# Patient Record
Sex: FEMALE | Race: WHITE | NOT HISPANIC OR LATINO | Employment: UNEMPLOYED | URBAN - METROPOLITAN AREA
[De-identification: names, ages, dates, MRNs, and addresses within clinical notes are randomized per-mention and may not be internally consistent; named-entity substitution may affect disease eponyms.]

---

## 2022-02-02 NOTE — PROGRESS NOTES
Subjective: seizures    Patient ID: Elaine Posada is a 37 y.o. female.    CHIEF COMPLAINT:seizures    Seizure onset: At age 26  Last Seizure: 2020   Seizure Frequency: q 3-6 months   Type of Seizures: Generalized convulsive   seizure meds: Dilantin 100 mg ER twice daily    History of Present Illness Ms. Posada is a 37-year-old  female who has a BMI of 31.71 , and presented with her  referred by JASMIN Presley for epilepsy.  New patient referred by Linda LORENZO for seizures.  She was recently placed on Medicaid and is able to have health care now.  She ports about 2 years without any kind of health care whatsoever.  She is not on a multivitamin nor she on any calcium supplement.          Triggers: stress  Duration:seconds-minutes Jolts sporadic   Frequency: 1  Every 3 to 6 months  Timing (awake/asleep/both): awake,   Aura: none  Features at onset: unidentified  Unresponsiveness (none/partial/complete): complete  Loss of awareness (none/partial/complete): complete  Eyes (closed/staring/rolled back/left/right): depends  head deviation (straight/left/right): both  Automatisms (none/lip smacking/fumbling): none  Stiffening/posturing (none/gen/left/right/upper/lower): hands   jerking (none/gen/left/right/upper/lower/asynchronous/myoclonic): yes  Tongue biting: yes  Speech (retained/arrested): arrested  Incontinence (none/bowel/bladder): urinary   Postictal duration/symptoms: minutes to hours, confusion    Last EEG 2010  MRI: None   CT 2018    Past medical history:  Schizophrenia, depression, anxiety, social axiety disorder, paranoid personality disorder, seizures disorder, mild asthma, and anemia  She was in a coma 2009 was on a ventilator.          EXAMINATION: CT head without contrast.   DATE: 05/31/2018   HISTORY: seizure like activity initial encounter     COMPARISON: None.   TECHNIQUE: Standard CT scan of the head without contrast.  All CT scans at this facility use dose modulation,  iterative reconstruction, and/or weight based dosing when appropriate to reduce radiation dose to as low as reasonably achievable.   FINDINGS:   There is no evidence of infarction, mass or hemorrhage. Parenchyma has a normal configuration for age.   The paranasal sinuses are clear. The mastoid air cells are clear.   IMPRESSION:   No acute intracranial findings.   Dictated by: Vee Strickland M.D.   Images and Report reviewed and interpreted by: Vee Strickland M.D.           The following portions of the patient's history were reviewed and updated as appropriate: allergies, current medications, past family history, past medical history, past social history, past surgical history and problem list.      Family History   Problem Relation Age of Onset   • Seizures Mother        Past Medical History:   Diagnosis Date   • Anemia    • Anxiety    • Depression    • Schizophrenia (HCC)        Social History     Socioeconomic History   • Marital status: Unknown   Tobacco Use   • Smoking status: Former Smoker   • Smokeless tobacco: Never Used   Vaping Use   • Vaping Use: Never used   Substance and Sexual Activity   • Alcohol use: Never   • Drug use: Never   • Sexual activity: Defer         Current Outpatient Medications:   •  montelukast (SINGULAIR) 10 MG tablet, Take 10 mg by mouth Daily., Disp: , Rfl:   •  omeprazole (priLOSEC) 20 MG capsule, TAKE ONE (1) CAPSULE BY MOUTH EVERY DAY 30 MINUTES BEFORE morning MEAL, Disp: , Rfl:   •  phenytoin ER (DILANTIN) 100 MG capsule, TAKE ONE (1) CAPSULE BY MOUTH EVERY TWELVE HOURS, Disp: , Rfl:   •  prazosin (MINIPRESS) 1 MG capsule, Take 1 capsule by mouth every night at bedtime., Disp: , Rfl:   •  prazosin (MINIPRESS) 5 MG capsule, Take 5 mg by mouth Every Night., Disp: , Rfl:   •  QUEtiapine (SEROquel) 50 MG tablet, Take 100 mg by mouth 2 (Two) Times a Day., Disp: , Rfl:   •  risperiDONE (risperDAL) 2 MG tablet, Take 4 mg by mouth Daily., Disp: , Rfl:   •  Calcium-Phosphorus-Vitamin D  (Citracal Calcium Gummies) 250-115-250 MG-MG-UNIT chewable tablet, Chew 250 mg Daily., Disp: 90 tablet, Rfl: 5  •  FLUoxetine (PROzac) 20 MG capsule, TAKE ONE (1) CAPSULE BY MOUTH DAILY, Disp: , Rfl:   •  multivitamin-iron-minerals-folic acid (CENTRUM) chewable tablet, Chew 1 tablet Daily., Disp: 30 tablet, Rfl: 11    Review of Systems   Constitutional: Positive for unexpected weight change.   HENT: Negative for ear discharge and ear pain.    Eyes: Negative for pain and itching.   Respiratory: Negative for cough and shortness of breath.    Cardiovascular: Negative for chest pain.   Gastrointestinal: Negative for abdominal pain and nausea.   Endocrine: Positive for cold intolerance. Negative for heat intolerance.   Genitourinary: Negative for urgency.   Musculoskeletal: Positive for back pain. Negative for neck pain.   Neurological: Positive for seizures and speech difficulty.        I have reviewed ROS completed by medical assistant.     Objective:    Neurologic Exam     Mental Status   Oriented to person, place, and time.     Cranial Nerves     CN III, IV, VI   Pupils are equal, round, and reactive to light.    Gait, Coordination, and Reflexes     Gait  Gait: normal    Coordination   Finger to nose coordination: normal  Tandem walking coordination: normal    Reflexes   Right brachioradialis: 2+  Left brachioradialis: 2+  Right biceps: 2+  Left biceps: 2+  Right triceps: 2+  Left triceps: 2+  Right patellar: 2+  Left patellar: 2+  Right achilles: 2+  Left achilles: 2+      Physical Exam  Vitals and nursing note reviewed.   Constitutional:       Appearance: Normal appearance. She is well-developed.   HENT:      Head: Normocephalic.      Right Ear: Tympanic membrane normal.      Mouth/Throat:      Mouth: Mucous membranes are moist.   Eyes:      General: Lids are normal. No visual field deficit.     Pupils: Pupils are equal, round, and reactive to light.   Cardiovascular:      Rate and Rhythm: Normal rate and regular  rhythm.      Pulses: Normal pulses.      Heart sounds: Normal heart sounds, S1 normal and S2 normal.   Pulmonary:      Effort: Pulmonary effort is normal.      Breath sounds: Normal breath sounds.   Musculoskeletal:         General: Normal range of motion.      Cervical back: Full passive range of motion without pain and normal range of motion.      Comments: 5/5 all including  and dorsiflexion and plantar flexion   Neurological:      General: No focal deficit present.      Mental Status: She is alert and oriented to person, place, and time.      Cranial Nerves: Cranial nerves are intact. No cranial nerve deficit, dysarthria or facial asymmetry.      Sensory: Sensation is intact. No sensory deficit.      Motor: Motor function is intact. No weakness, tremor, atrophy, abnormal muscle tone, seizure activity or pronator drift.      Coordination: Coordination is intact. Romberg sign negative. Coordination normal. Finger-Nose-Finger Test and Heel to Shin Test normal. Rapid alternating movements normal.      Gait: Gait is intact. Gait and tandem walk normal.      Deep Tendon Reflexes: Babinski sign absent on the right side. Babinski sign absent on the left side.      Reflex Scores:       Tricep reflexes are 2+ on the right side and 2+ on the left side.       Bicep reflexes are 2+ on the right side and 2+ on the left side.       Brachioradialis reflexes are 2+ on the right side and 2+ on the left side.       Patellar reflexes are 2+ on the right side and 2+ on the left side.       Achilles reflexes are 2+ on the right side and 2+ on the left side.  Psychiatric:         Attention and Perception: Attention normal.         Mood and Affect: Mood normal.         Behavior: Behavior normal. Behavior is cooperative.         Thought Content: Thought content normal.     Discussion:   The patient and her  were notified of seizure precautions and agreed.    They are as follows:-The patient was told to observe all seizure  precautions, including, but not limited to:   -No driving until seizure free for more than 3 months- as per State driving regulation / law;   -Avoid all high-risk activity, Take showers instead of baths, avoid swimming without observation, Avoid open heat sources, Avoid working at heights, and Avoid engaging in all potentially hazardous activities.   -Patient and  expressed clear understanding.  She was notified not to miss any medications and was told that after the EEG is completed we may proceed with changing her to medication instead of Dilantin.  Multivitamin and calcium supplement will be ordered.     Assessment/Plan:    Diagnoses and all orders for this visit:    1. Other epilepsy without status epilepticus, not intractable (HCC) (Primary)  -     EEG Awake or Asleep Routine; Future  -     MRI Brain With & Without Contrast; Future  -     multivitamin-iron-minerals-folic acid (CENTRUM) chewable tablet; Chew 1 tablet Daily.  Dispense: 30 tablet; Refill: 11  -     Calcium-Phosphorus-Vitamin D (Citracal Calcium Gummies) 250-115-250 MG-MG-UNIT chewable tablet; Chew 250 mg Daily.  Dispense: 90 tablet; Refill: 5        Return in about 3 months (around 5/7/2022) for Dr Seipel .    I spent 54 minutes caring for Elaine on this date of service. This time includes time spent by me in the following activities: reviewing tests, obtaining and/or reviewing a separately obtained history, performing a medically appropriate examination and/or evaluation, ordering medications, tests, or procedures, referring and communicating with other health care professionals and documenting information in the medical record.      This document has been electronically signed by Bebe CASTELLANOS on February 7, 2022 13:47 EST

## 2022-02-03 PROBLEM — R56.9 SEIZURE: Status: ACTIVE | Noted: 2022-02-03

## 2022-02-03 PROBLEM — F19.10 SUBSTANCE ABUSE: Status: ACTIVE | Noted: 2022-02-03

## 2022-02-03 RX ORDER — PRAZOSIN HYDROCHLORIDE 1 MG/1
1 CAPSULE ORAL
COMMUNITY
Start: 2022-01-10

## 2022-02-03 RX ORDER — RISPERIDONE 2 MG/1
4 TABLET ORAL DAILY
COMMUNITY
Start: 2022-01-04 | End: 2022-05-17

## 2022-02-03 RX ORDER — QUETIAPINE FUMARATE 50 MG/1
100 TABLET, FILM COATED ORAL 2 TIMES DAILY
COMMUNITY
Start: 2022-01-05 | End: 2022-05-17

## 2022-02-03 RX ORDER — ALBUTEROL SULFATE 90 UG/1
2 AEROSOL, METERED RESPIRATORY (INHALATION)
COMMUNITY
Start: 2022-01-27 | End: 2022-02-07

## 2022-02-03 RX ORDER — MONTELUKAST SODIUM 10 MG/1
10 TABLET ORAL DAILY
COMMUNITY
Start: 2022-01-27 | End: 2022-02-26

## 2022-02-07 ENCOUNTER — TELEPHONE (OUTPATIENT)
Dept: NEUROLOGY | Facility: CLINIC | Age: 37
End: 2022-02-07

## 2022-02-07 ENCOUNTER — OFFICE VISIT (OUTPATIENT)
Dept: NEUROLOGY | Facility: CLINIC | Age: 37
End: 2022-02-07

## 2022-02-07 VITALS
DIASTOLIC BLOOD PRESSURE: 79 MMHG | HEART RATE: 112 BPM | SYSTOLIC BLOOD PRESSURE: 120 MMHG | WEIGHT: 157 LBS | TEMPERATURE: 97.3 F | BODY MASS INDEX: 31.65 KG/M2 | HEIGHT: 59 IN

## 2022-02-07 DIAGNOSIS — G40.802 OTHER EPILEPSY WITHOUT STATUS EPILEPTICUS, NOT INTRACTABLE: Primary | ICD-10-CM

## 2022-02-07 PROCEDURE — 99204 OFFICE O/P NEW MOD 45 MIN: CPT | Performed by: NURSE PRACTITIONER

## 2022-02-07 RX ORDER — FLUOXETINE HYDROCHLORIDE 20 MG/1
CAPSULE ORAL
COMMUNITY
Start: 2022-01-10 | End: 2022-05-17

## 2022-02-07 RX ORDER — OMEPRAZOLE 20 MG/1
CAPSULE, DELAYED RELEASE ORAL
COMMUNITY
Start: 2021-12-10

## 2022-02-07 RX ORDER — PRAZOSIN HYDROCHLORIDE 5 MG/1
5 CAPSULE ORAL NIGHTLY
COMMUNITY

## 2022-02-07 RX ORDER — PHENYTOIN SODIUM 100 MG/1
CAPSULE, EXTENDED RELEASE ORAL
COMMUNITY
Start: 2022-01-07 | End: 2022-05-17 | Stop reason: SDUPTHER

## 2022-02-07 RX ORDER — FOLIC ACID/MULTIVIT,IRON,MINER .4-18-35
1 TABLET,CHEWABLE ORAL DAILY
Qty: 30 TABLET | Refills: 11 | Status: SHIPPED | OUTPATIENT
Start: 2022-02-07 | End: 2023-02-07

## 2022-02-07 RX ORDER — D-METHORPHAN/PE/ACETAMINOPHEN 10-5-325MG
250 CAPSULE ORAL DAILY
Qty: 90 TABLET | Refills: 5 | Status: SHIPPED | OUTPATIENT
Start: 2022-02-07 | End: 2023-02-07

## 2022-02-07 NOTE — TELEPHONE ENCOUNTER
PT CALLED TO HAVE HER MEDICAL RECORDS SENT TO [facility].    GAVE HER THE REGIS PHONE AND FAX NUMBERS.

## 2022-02-23 ENCOUNTER — APPOINTMENT (OUTPATIENT)
Dept: NEUROLOGY | Facility: HOSPITAL | Age: 37
End: 2022-02-23

## 2022-02-23 ENCOUNTER — APPOINTMENT (OUTPATIENT)
Dept: MRI IMAGING | Facility: HOSPITAL | Age: 37
End: 2022-02-23

## 2022-03-01 ENCOUNTER — HOSPITAL ENCOUNTER (OUTPATIENT)
Dept: NEUROLOGY | Facility: HOSPITAL | Age: 37
Discharge: HOME | End: 2022-03-01

## 2022-03-01 ENCOUNTER — HOSPITAL ENCOUNTER (OUTPATIENT)
Dept: MRI IMAGING | Facility: HOSPITAL | Age: 37
Discharge: HOME | End: 2022-03-01

## 2022-03-01 DIAGNOSIS — G40.802 OTHER EPILEPSY WITHOUT STATUS EPILEPTICUS, NOT INTRACTABLE: ICD-10-CM

## 2022-03-01 PROCEDURE — 25010000002 GADOTERIDOL PER 1 ML: Performed by: NURSE PRACTITIONER

## 2022-03-01 PROCEDURE — A9579 GAD-BASE MR CONTRAST NOS,1ML: HCPCS | Performed by: NURSE PRACTITIONER

## 2022-03-01 PROCEDURE — 95819 EEG AWAKE AND ASLEEP: CPT | Performed by: PSYCHIATRY & NEUROLOGY

## 2022-03-01 PROCEDURE — 70553 MRI BRAIN STEM W/O & W/DYE: CPT

## 2022-03-01 PROCEDURE — 95819 EEG AWAKE AND ASLEEP: CPT

## 2022-03-01 RX ADMIN — GADOTERIDOL 15 ML: 279.3 INJECTION, SOLUTION INTRAVENOUS at 13:14

## 2022-05-12 NOTE — PROGRESS NOTES
Subjective: seizures    Patient ID: Elaine Posada is a 37 y.o. female.    Seizure onset: At age 26  Last Seizure: 10/2020   Seizure Frequency: q 3-6 months   Type of Seizures: Generalized convulsive   Seizure meds: Dilantin 100 mg ER twice daily    History of Present Illness Ms. Posada is a 37-year-old  female who presented today with her  for a follow-up appointment in regard to epilepsy.  The patient has had an EEG which was reported as normal and has recently undergone an MRI of the brain which showed nonspecific punctate foci of increased T2 signal in the deep white matter of the bilateral frontal lobes and the history of a smoker is considered within normal limits.  The patient denies any side effects from the current Dilantin  mg twice daily and states she has started a calcium supplement and a multivitamin.  She has not had any seizures since her last visit.    Her  states that she does snore and have apneic spells at night and has had some jerking whenever she awakens from an apneic spell.  The patient has never had sleep testing.  She was notified that sleep apnea could increase her risk for breakthrough seizures as well as increase her risk for stroke heart disease and high blood pressure.  She was in agreement with undergoing a home sleep test.        Testing:   EEG  DATE OF RECORDING: March 1, 2022  EEG number: [id number]  TOTAL RECORDING TIME 34 minutes  PATIENTS NAME; Elaine Posada  INDICATION: Seizure disorder  HISTORY: This patient is a 37-year-old female with history of seizures.   TECHNICAL DESCRIPTION: This was a standard EEG recorded using the international 10 /20 system of electrode placement.  The following provoking procedures were undertaken: hyperventilation Yes, photic stimulation Yes,   RESULTS: During the awake state a poorly  modulated alpha rhythm of 9 hertz was seen in the posterior region bilaterally.  The alpha rhythm attenuated to eye opening.  Some  muscle artifact was seen in the frontal and temporal regions with eye blink and eye movement artifact in the frontal regions.   With hyperventilation there were,No , changes in the EEG background.  With photic stimulation, No, photic driving response was seen.  With the onset of drowsiness there was an attenuation of the alpha rhythm, roving eye movement artifact in the frontal regions and the development of theta frequency activity bilaterally.  Symmetric vertex sharp waves were seen bilaterally with drowsiness.  No focal or epileptiform abnormalities were seen.   IMPRESSION:  This is a normal EEG recorded during the awake and drowsy state. The absence of epileptiform abnormalities however does not rule out the presence of a seizure disorder.  Electronically signed by   Joseph Seipel, MD  March 4, 2022      MRI BRAIN W WO CONTRAST-   Date of Exam: 3/1/2022 12:40 PM   Indication: Epilepsy; G40.802-Other epilepsy, not intractable, without  status epilepticus.   IMPRESSION:  1.  No acute intracranial abnormality.  2.  No pathologic contrast enhancement.  3.  No anatomic abnormality seen intact as a seizure focus.  4.  Nonspecific punctate foci of increased T2 signal within the deep  white matter the bilateral frontal lobes.  This can be seen in setting  sequela of chronic migraine.   Electronically Signed By-Devon Ga MD On:3/1/2022 1:50 PM  This report was finalized on [id number] by  Devon Ga MD          The following portions of the patient's history were reviewed and updated as appropriate: allergies, current medications, past family history, past medical history, past social history, past surgical history and problem list.    Family History   Problem Relation Age of Onset   • Seizures Mother        Past Medical History:   Diagnosis Date   • Anemia    • Anxiety    • Depression    • Schizophrenia (HCC)        Social History     Socioeconomic History   • Marital status: Unknown   Tobacco Use   •  Smoking status: Former Smoker   • Smokeless tobacco: Never Used   Vaping Use   • Vaping Use: Never used   Substance and Sexual Activity   • Alcohol use: Never   • Drug use: Never   • Sexual activity: Defer         Current Outpatient Medications:   •  Calcium-Phosphorus-Vitamin D (Citracal Calcium Gummies) 250-115-250 MG-MG-UNIT chewable tablet, Chew 250 mg Daily., Disp: 90 tablet, Rfl: 5  •  multivitamin-iron-minerals-folic acid (CENTRUM) chewable tablet, Chew 1 tablet Daily., Disp: 30 tablet, Rfl: 11  •  OLANZapine (zyPREXA) 15 MG tablet, TAKE ONE (1) TABLET BY MOUTH EVERY DAY, Disp: , Rfl:   •  omeprazole (priLOSEC) 20 MG capsule, TAKE ONE (1) CAPSULE BY MOUTH EVERY DAY 30 MINUTES BEFORE morning MEAL, Disp: , Rfl:   •  Omeprazole 20 MG tablet delayed-release, TAKE ONE (1) TABLET BY MOUTH EVERY DAY 30 MINUTES BEFORE morning MEAL, Disp: , Rfl:   •  phenytoin ER (DILANTIN) 100 MG capsule, Take 1 capsule by mouth 2 (Two) Times a Day., Disp: 60 capsule, Rfl: 3  •  prazosin (MINIPRESS) 1 MG capsule, Take 1 capsule by mouth every night at bedtime., Disp: , Rfl:   •  prazosin (MINIPRESS) 5 MG capsule, Take 5 mg by mouth Every Night., Disp: , Rfl:   •  montelukast (SINGULAIR) 10 MG tablet, Take 10 mg by mouth Daily., Disp: , Rfl:     Review of Systems   Constitutional: Positive for fatigue. Negative for fever.   HENT: Negative for ear discharge and ear pain.    Eyes: Negative for pain and itching.   Respiratory: Negative for cough and shortness of breath.    Cardiovascular: Positive for palpitations. Negative for chest pain.   Gastrointestinal: Negative for abdominal pain and nausea.   Musculoskeletal: Negative for back pain and neck pain.   Neurological: Positive for dizziness and light-headedness.   Psychiatric/Behavioral: Negative for agitation and confusion.          I have reviewed ROS completed by medical assistant.     Objective:    Neurologic Exam     Mental Status   Oriented to person, place, and time.     Cranial  Nerves     CN III, IV, VI   Pupils are equal, round, and reactive to light.      Physical Exam  Vitals and nursing note reviewed.   Constitutional:       Appearance: Normal appearance.   HENT:      Head: Normocephalic.      Nose: Nose normal.      Mouth/Throat:      Mouth: Mucous membranes are moist.   Eyes:      Pupils: Pupils are equal, round, and reactive to light.   Cardiovascular:      Rate and Rhythm: Normal rate and regular rhythm.   Pulmonary:      Effort: Pulmonary effort is normal.   Musculoskeletal:         General: Normal range of motion.      Cervical back: Normal range of motion.   Skin:     General: Skin is warm.   Neurological:      General: No focal deficit present.      Mental Status: She is alert and oriented to person, place, and time.   Psychiatric:         Mood and Affect: Mood normal.         Thought Content: Thought content normal.         Assessment/Plan:  Discussion: The patient will be scheduled for a second EEG, we are hopeful that we will catch epilepsy episode so that we can fine-tune the patient's medication and remove her from Dilantin therapy to a better long-term antiseizure medication.  She states she does have apneic spells and her  agrees that the patient will be scheduled for home sleep test and will be both seen back in follow-up for neurology and for sleep.    -The patient was told to observe all seizure precautions, including, but not limited to:   -No driving until seizure free for more than 3 months- as per State driving regulation / law;   -Avoid all high-risk activity, Take showers instead of baths, avoid swimming without observation, Avoid open heat sources, Avoid working at heights, and Avoid engaging in all potentially hazardous activities.   -The patient and her  expressed clear understanding    Diagnoses and all orders for this visit:    1. Apnea (Primary)  -     Home Sleep Study; Future    2. Other epilepsy without status epilepticus, not intractable  (HCC)  -     phenytoin ER (DILANTIN) 100 MG capsule; Take 1 capsule by mouth 2 (Two) Times a Day.  Dispense: 60 capsule; Refill: 3  -     EEG Awake or Asleep Routine; Future          Return in about 6 months (around 11/17/2022) for 6 months Dr Seipel Neuro + Sleep Follow up post study .     I spent 42 minutes caring for Elaine on this date of service. This time includes time spent by me in the following activities: reviewing tests, obtaining and/or reviewing a separately obtained history, performing a medically appropriate examination and/or evaluation, counseling and educating the patient/family/caregiver, ordering medications, tests, or procedures and documenting information in the medical record.      This document has been electronically signed by JASMIN García on May 17, 2022 12:58 EDT

## 2022-05-17 ENCOUNTER — OFFICE VISIT (OUTPATIENT)
Dept: NEUROLOGY | Facility: CLINIC | Age: 37
End: 2022-05-17

## 2022-05-17 VITALS
WEIGHT: 164 LBS | SYSTOLIC BLOOD PRESSURE: 118 MMHG | HEIGHT: 59 IN | BODY MASS INDEX: 33.06 KG/M2 | TEMPERATURE: 98.5 F | DIASTOLIC BLOOD PRESSURE: 82 MMHG | HEART RATE: 118 BPM

## 2022-05-17 DIAGNOSIS — G40.802 OTHER EPILEPSY WITHOUT STATUS EPILEPTICUS, NOT INTRACTABLE: ICD-10-CM

## 2022-05-17 DIAGNOSIS — R06.81 APNEA: Primary | ICD-10-CM

## 2022-05-17 PROCEDURE — 99215 OFFICE O/P EST HI 40 MIN: CPT | Performed by: NURSE PRACTITIONER

## 2022-05-17 RX ORDER — OLANZAPINE 15 MG/1
TABLET ORAL
COMMUNITY
Start: 2022-05-09

## 2022-05-17 RX ORDER — PHENYTOIN SODIUM 100 MG/1
100 CAPSULE, EXTENDED RELEASE ORAL 2 TIMES DAILY
Qty: 60 CAPSULE | Refills: 3 | Status: SHIPPED | OUTPATIENT
Start: 2022-05-17 | End: 2023-01-27 | Stop reason: SDUPTHER

## 2022-05-17 RX ORDER — RISPERIDONE 4 MG/1
TABLET ORAL
COMMUNITY
Start: 2022-03-04 | End: 2022-05-17

## 2022-05-17 RX ORDER — NICOTINE POLACRILEX 4 MG/1
GUM, CHEWING ORAL
COMMUNITY
Start: 2022-04-22

## 2022-05-17 RX ORDER — QUETIAPINE FUMARATE 100 MG/1
TABLET, FILM COATED ORAL
COMMUNITY
Start: 2022-02-25 | End: 2022-05-17

## 2022-07-13 ENCOUNTER — HOSPITAL ENCOUNTER (OUTPATIENT)
Dept: SLEEP MEDICINE | Facility: HOSPITAL | Age: 37
Discharge: HOME | End: 2022-07-13
Admitting: NURSE PRACTITIONER

## 2022-07-13 DIAGNOSIS — R06.81 APNEA: ICD-10-CM

## 2022-07-13 PROCEDURE — 95806 SLEEP STUDY UNATT&RESP EFFT: CPT

## 2022-07-13 PROCEDURE — 95806 SLEEP STUDY UNATT&RESP EFFT: CPT | Performed by: PSYCHIATRY & NEUROLOGY

## 2022-07-18 NOTE — PROGRESS NOTES
I called the patient and her  and spoke with both of them regarding the Sleep test.  They were notified that Dr. Seiple and found she only has significant sleep apnea in the supine position and in the nonsupine positions she was good.  They were notified the easiest way to continue to sleep on your side is to wear a backpack with some pillows stuffed in it so that whenever you try to roll back onto your back you have some discomfort and automatically keep yourself on your side.  They were notified if her sleepiness still continued that she may need repeat testing if this does not help.  They were in agreement.

## 2023-01-27 DIAGNOSIS — G40.802 OTHER EPILEPSY WITHOUT STATUS EPILEPTICUS, NOT INTRACTABLE: ICD-10-CM

## 2023-01-27 RX ORDER — PHENYTOIN SODIUM 100 MG/1
100 CAPSULE, EXTENDED RELEASE ORAL 2 TIMES DAILY
Qty: 60 CAPSULE | Refills: 3 | Status: SHIPPED | OUTPATIENT
Start: 2023-01-27

## 2023-01-27 NOTE — TELEPHONE ENCOUNTER
Caller: ANASTASIA    Relationship: PATIENT    Best call back number: [phone]    Requested Prescriptions:   Requested Prescriptions     Pending Prescriptions Disp Refills   • phenytoin ER (DILANTIN) 100 MG capsule 60 capsule 3     Sig: Take 1 capsule by mouth 2 (Two) Times a Day.        Pharmacy where request should be sent: [facility] [address] [phone] [facility] [phone]     Additional details provided by patient: PATIENT IS OUT OF MEDICATION    Does the patient have less than a 3 day supply:  [x] Yes  [] No    Would you like a call back once the refill request has been completed: [x] Yes [] No    If the office needs to give you a call back, can they leave a voicemail: [x] Yes [] No    Usman Degroot Rep   01/27/23 08:43 EST

## 2024-04-02 ENCOUNTER — TELEPHONE (OUTPATIENT)
Dept: NEUROLOGY | Facility: CLINIC | Age: 39
End: 2024-04-02
Payer: MEDICARE

## 2024-04-02 NOTE — TELEPHONE ENCOUNTER
Provider: MARLIN CARTAGENA APRN     Caller: ANASTASIA    Relationship to Patient: SELF    Phone Number: [phone]    Reason for Call: CALLING FOR AN APPT FOR PROVIDER.   PATIENT NOW HAS  [facility] DUAL COMPLETE.  UPDATE IN CHART.   WHEN TRYING TO SCHDULE IT IS SAYING OUT OF NETWORK.   CALLED S/W KWASI AT CLINIC AND ADVISED TO SEND A MESSAGE D/T BRITTANY IS IN A MEETING.   ADVISED PATIENT AND THEY SAID THEY HAVE OUT OF NETWORK  BENEFITS.      When was the patient last seen: 5-17-22      PLEASE CALL & ADVISE

## 2024-04-03 DIAGNOSIS — R56.9 SEIZURE: Primary | ICD-10-CM

## 2024-04-23 ENCOUNTER — HOSPITAL ENCOUNTER (OUTPATIENT)
Dept: NEUROLOGY | Facility: HOSPITAL | Age: 39
Discharge: HOME | End: 2024-04-23
Payer: MEDICARE

## 2024-04-23 DIAGNOSIS — R56.9 SEIZURE: ICD-10-CM

## 2024-04-23 PROCEDURE — 95813 EEG EXTND MNTR 61-119 MIN: CPT

## 2024-04-27 ENCOUNTER — TELEPHONE (OUTPATIENT)
Dept: NEUROLOGY | Facility: CLINIC | Age: 39
End: 2024-04-27
Payer: MEDICARE

## 2024-04-29 ENCOUNTER — TELEPHONE (OUTPATIENT)
Dept: NEUROLOGY | Facility: CLINIC | Age: 39
End: 2024-04-29
Payer: MEDICARE

## 2024-04-29 NOTE — TELEPHONE ENCOUNTER
PT IS RETURNING CALL FOR TEST RESULTS    CALL KAREEM [phone] CAN LEAVE A DETAILED MESS ON V.M         PLEASE ADVISE

## 2024-04-29 NOTE — TELEPHONE ENCOUNTER
Spoke to patient about her EEG results. She states she has never had an injury to that area of her head.

## 2024-04-29 NOTE — TELEPHONE ENCOUNTER
----- Message from Terrence LIZARRAGA sent at 4/27/2024  9:48 AM EDT -----  Please call the patient regarding her abnormal result.  Abnormal shows seizures onset from left frontal temporal central region.  Prior injury there?     IMPRESSION:     This is an abnormal EEG due to the presence of interictal sharp and slow wave activity in the left frontal temporal central region and electrographic seizures.  Electrographic seizures originate in the left temporal region and then quickly generalized with no apparent clinical correlate except towards the end of the apparent seizure activity patient with move his head and arm.  The clinical correlation cannot be stated with certainty as the patient was covered by a blanket including most of his head.     The duration of the electrographic seizure activity varied from 10 to 30 seconds.  10 to 30-second duration with probable onset in the left temporal central region.             Electronically signed by:     Joseph Seipel, MD  April 26, 2024

## 2024-04-30 ENCOUNTER — TELEPHONE (OUTPATIENT)
Dept: NEUROLOGY | Facility: CLINIC | Age: 39
End: 2024-04-30

## 2024-04-30 DIAGNOSIS — R56.9 SEIZURE: Primary | ICD-10-CM

## 2024-04-30 NOTE — TELEPHONE ENCOUNTER
I called behavorial health that she sees- they stated they did not check her Dilantin levels only a UDS was completed. Please advise

## 2024-04-30 NOTE — TELEPHONE ENCOUNTER
I can order a Dilantin trough level to be first thing in the morning before she takes her morning dose.  If you can contact the patient and tell her to go to the lab I will put the orders in right now.

## 2024-04-30 NOTE — TELEPHONE ENCOUNTER
Caller: Elaine Posada    Relationship: Self    Best call back number: [phone]    What was the call regarding: PT CALLED WITH 2 QUESTIONS:    PT ASKS IF DILANTIN IS STILL THE BEST FIT ANTI-SEIZURE MEDICATION FOR HER TO BE TAKING GIVEN HER RECENT EEG RESULTS FINDING SEIZURES FROM THE LEFT TEMPORAL CENTRAL REGION?    PT STATES SHE SAW A NEW PSYCHIATRIST, ANNA REYES-CRAWHORN, APRN, IN FEBRUARY 2024. SHE HAD ORDERED LABS FOR PT, CHECKING HER DILANTIN LEVELS. THE RESULTS CAME BACK AS LOW. PT IS WONDERING IF HER DILANTIN RX MAY NEED TO BE ADJUSTED GIVEN HER LOW LEVELS BACK IN FEBRUARY. [facility] CAN BE REACHED AT [phone] IF OFFICE WOULD LIKE TO REQUEST THESE LAB RESULTS.    Do you require a callback: YES, PLEASE.    PLEASE REVIEW AND ADVISE.

## 2024-07-02 ENCOUNTER — OFFICE VISIT (OUTPATIENT)
Dept: NEUROLOGY | Facility: CLINIC | Age: 39
End: 2024-07-02
Payer: MEDICARE

## 2024-07-02 VITALS — BODY MASS INDEX: 33.06 KG/M2 | HEIGHT: 59 IN | WEIGHT: 164 LBS

## 2024-07-02 DIAGNOSIS — G40.409 OTHER GENERALIZED EPILEPSY, NOT INTRACTABLE, WITHOUT STATUS EPILEPTICUS: Primary | ICD-10-CM

## 2024-07-02 RX ORDER — GABAPENTIN 600 MG/1
600 TABLET ORAL 3 TIMES DAILY
COMMUNITY
Start: 2024-07-01

## 2024-07-02 RX ORDER — LEVETIRACETAM 250 MG/1
TABLET ORAL
Qty: 180 TABLET | Refills: 6 | Status: SHIPPED | OUTPATIENT
Start: 2024-07-02 | End: 2024-07-03 | Stop reason: DRUGHIGH

## 2024-07-02 RX ORDER — ALPRAZOLAM 0.5 MG/1
0.5 TABLET ORAL 3 TIMES DAILY PRN
COMMUNITY

## 2024-07-02 RX ORDER — LAMOTRIGINE 200 MG/1
200 TABLET ORAL DAILY
COMMUNITY
End: 2024-07-02

## 2024-07-02 RX ORDER — DEXTROAMPHETAMINE SACCHARATE, AMPHETAMINE ASPARTATE, DEXTROAMPHETAMINE SULFATE AND AMPHETAMINE SULFATE 5; 5; 5; 5 MG/1; MG/1; MG/1; MG/1
20 TABLET ORAL
COMMUNITY
Start: 2024-03-27

## 2024-07-02 RX ORDER — ESCITALOPRAM OXALATE 10 MG/1
10 TABLET ORAL DAILY
COMMUNITY

## 2024-07-02 NOTE — PROGRESS NOTES
Subjective: Epilepsy generalized convulsive    Patient ID: Elaine Posada is a 39 y.o. female.    History of Present Illness Ms. Posada is a 39-year-old female who is presenting today for a follow-up on epilepsy.  She has been treated long-term with Dilantin 100 mg ER.  She was told to keep taking a supplement of calcium and a multivitamin.  She has had a sleep test in 2020 which showed she was worse on her back and should be sleeping on her side, if her sleepiness is worse she may need another sleep test.  Since her last visit she has had several seizures in her sleep.  The patient's last 4-hour sleep deprived EEG was positive for epilepsy.  I discussed with Ms. Sams and her  changing from Dilantin ER to Keppra which could give her better control and also keep her from having side effects long-term from the Dilantin.  They were both in agreement.  The patient states that her birth mother had bad epilepsy that was not controlled.  This is most likely the cause of her epilepsy.    Seizure Type: Generalized convulsive  Onset: Age 26  Frequency: sporadic  Last Seizure: a couple nights ago she had one in her sleep, she realized it because she bit her tongue  Semiology change:  Medication: Dilantin 100 mg ER twice daily                      Patient needs a calcium supplement and multivitamin  Any adverse effects of meds?  Failed medications?     Dilantin weaning schedule  Week 1 Keppra 250 twice daily                     no change in Dilantin  Week 2 Keppra 500 twice daily                     no change in Dilantin  Week 3 Keppra 750 twice daily                    decrease Dilantin by 1 tab  Week 4 Keppra the same 750 twice daily     stop Dilantin           Testin hour Sleep Deprived EEG  IMPRESSION:  This is an abnormal EEG due to the presence of interictal sharp and slow wave activity in the left frontal temporal central region and electrographic seizures.  Electrographic seizures originate in  the left temporal region and then quickly generalized with no apparent clinical correlate except towards the end of the apparent seizure activity patient with move her head and arm.The clinical correlation cannot be stated with certainty as the patient was covered by a blanket including most of her head. The duration of the electrographic seizure activity varied from 10 to 30 seconds.  10 to 30-second duration with probable onset in the left temporal central region.    Electronically signed by:  Joseph Seipel, MD    April 26, 2024  Sleep testing 7/13/2022  I called the patient and her  and spoke with both of them regarding the Sleep test. They were notified that Dr. Seiple and found she only has significant sleep apnea in the supine position and in the nonsupine positions she was good. They were notified the easiest way to continue to sleep on your side is to wear a backpack with some pillows stuffed in it so that whenever you try to roll back onto your back you have some discomfort and automatically keep yourself on your side. They were notified if her sleepiness still continued that she may need repeat testing if this does not help. They were in agreement.   Test data:   Summary:  1.  The total acceptable monitoring time is 346 minutes.  A total of 87 minutes was in the supine position, and 228, in the left side, 68 in the right side,   2.  The AHI / SCOTT is 3.8 per hour.  The SCOTT in the supine position was 5.5/h in the nonsupine position 2.8-4.4/h  3.  The average SPO2 was 95% the lowest SPO2 was 75%.  The total recording time associated with an SPO2 of less than 90% was 0 minutes.  4.  The average heart rate was 76 bpm, with maximum of 106 and minimum 53 bpm.  5.  There was 19 minutes of recording associated with snoring.  Snoring was noted off and on during the recording  Impression:   This study demonstrates significant obstructive sleep apnea only in the supine position   Obstructive sleep apnea:  G47.33  Recommendation:  Has significant sleep apnea was found only in the supine position measures to encourage sleep in the nonsupine positions may be indicated for this patient.  Electronically signed by:      July 14, 2022  Joseph Seipel, MD   Date  Diplomat, [employer]                  MRI BRAIN W WO CONTRAST-   Date of Exam: 3/1/2022 12:40 PM   Indication: Epilepsy; G40.802-Other epilepsy, not intractable, without  status epilepticus.   IMPRESSION:  1.  No acute intracranial abnormality.  2.  No pathologic contrast enhancement.  3.  No anatomic abnormality seen intact as a seizure focus.  4.  Nonspecific punctate foci of increased T2 signal within the deep  white matter the bilateral frontal lobes.  This can be seen in setting  sequela of chronic migraine.   Electronically Signed By-Devon Ga MD On:3/1/2022 1:50 PM  This report was finalized on [id number] by  Devon Ga MD    The following portions of the patient's history were reviewed and updated as appropriate: allergies, current medications, past family history, past medical history, past social history, past surgical history and problem list.    Family History   Problem Relation Age of Onset    Seizures Mother        Past Medical History:   Diagnosis Date    Anemia     Anxiety     Depression     Schizophrenia        Social History     Socioeconomic History    Marital status:    Tobacco Use    Smoking status: Former    Smokeless tobacco: Never   Vaping Use    Vaping status: Never Used   Substance and Sexual Activity    Alcohol use: Never    Drug use: Never    Sexual activity: Defer         Current Outpatient Medications:     ALPRAZolam (XANAX) 0.5 MG tablet, Take 1 tablet by mouth 3 (Three) Times a Day As Needed., Disp: , Rfl:     amphetamine-dextroamphetamine (ADDERALL) 20 MG tablet, Take 1 tablet by mouth., Disp: , Rfl:     escitalopram (LEXAPRO) 10 MG tablet, Take 1 tablet by mouth Daily., Disp: , Rfl:      gabapentin (NEURONTIN) 600 MG tablet, Take 1 tablet by mouth 3 (Three) Times a Day., Disp: , Rfl:     phenytoin ER (DILANTIN) 100 MG capsule, Take 1 capsule by mouth 2 (Two) Times a Day., Disp: 60 capsule, Rfl: 3    prazosin (MINIPRESS) 1 MG capsule, Take 1 capsule by mouth every night at bedtime., Disp: , Rfl:     prazosin (MINIPRESS) 5 MG capsule, Take 1 capsule by mouth Every Night., Disp: , Rfl:     levETIRAcetam (KEPPRA) 250 MG tablet, Week1: 1 tab bid, Week2: 2 tab twice a day, Week3: 3 tabs twice a day, Disp: 180 tablet, Rfl: 6    Review of Systems       I have reviewed ROS completed by medical assistant.     Objective:    Neurologic Exam     Mental Status   Oriented to person, place, and time.   Speech: speech is normal     Cranial Nerves     CN III, IV, VI   Pupils are equal, round, and reactive to light.      Physical Exam  Vitals reviewed.   Constitutional:       Appearance: Normal appearance. She is well-developed, well-groomed and overweight.   HENT:      Head: Normocephalic and atraumatic.      Right Ear: Hearing normal.      Left Ear: Hearing normal.      Nose: Nose normal.      Mouth/Throat:      Lips: Pink.      Mouth: Mucous membranes are moist.   Eyes:      General: Lids are normal. No visual field deficit.     Pupils: Pupils are equal, round, and reactive to light.   Cardiovascular:      Rate and Rhythm: Normal rate.   Pulmonary:      Effort: Pulmonary effort is normal.   Musculoskeletal:         General: Normal range of motion.      Cervical back: Full passive range of motion without pain and normal range of motion.   Skin:     General: Skin is warm and dry.   Neurological:      Mental Status: She is alert and oriented to person, place, and time.      Cranial Nerves: No dysarthria or facial asymmetry.      Sensory: No sensory deficit.      Motor: No weakness, tremor, atrophy, abnormal muscle tone or seizure activity.      Gait: Gait normal.   Psychiatric:         Attention and Perception:  Attention and perception normal.         Mood and Affect: Mood normal.         Speech: Speech normal.         Behavior: Behavior is cooperative.         Thought Content: Thought content normal.       Assessment/Plan:  Discussion: The patient is to follow the above titration schedule for Keppra and plan on decreasing Dilantin.  They are to call the clinic if she has any breakthrough seizures during this titration.  The patient is aware of seizure precautions:  The patient was told to observe all seizure precautions, including, but not limited to:   If a Seizures occurs: No driving until seizure free for more than 3 months  Avoid all high-risk activity, examples: Take showers instead of baths, avoid swimming without observation, Avoid open heat sources, Avoid working at heights, and Avoid engaging in all potentially hazardous activities , Avoid use of fire arms/hunting,    Patient expressed clear understanding.   Extensive clinic time counseling patient and family on the following topics: common seizure triggers (sleep deprivation, medication non-compliance, stress, fever, and drugs/alcohol), personal risk for recurrence, epilepsy specific safety issues, and seizure first aid. If events last longer than 5 minutes, if the patient has multiple events without return to baseline, or if the patient has a serious injury from seizure, I advised them to call 911 immediately.       Diagnoses and all orders for this visit:    1. Other generalized epilepsy, not intractable, without status epilepticus (Primary)  -     levETIRAcetam (KEPPRA) 250 MG tablet; Week1: 1 tab bid, Week2: 2 tab twice a day, Week3: 3 tabs twice a day  Dispense: 180 tablet; Refill: 6          Return Next available.     I spent 53 minutes caring for Elaine on this date of service. This time includes time spent by me in the following activities: preparing for the visit, reviewing tests, obtaining and/or reviewing a separately obtained history, performing a  medically appropriate examination and/or evaluation, counseling and educating the patient/family/caregiver, ordering medications, tests, or procedures, documenting information in the medical record, and independently interpreting results and communicating that information with the patient/family/caregiver.      This document has been electronically signed by JASMIN García on July 2, 2024 17:34 EDT

## 2024-07-03 DIAGNOSIS — G40.409 OTHER GENERALIZED EPILEPSY, NOT INTRACTABLE, WITHOUT STATUS EPILEPTICUS: Primary | ICD-10-CM

## 2024-07-03 RX ORDER — LEVETIRACETAM 750 MG/1
750 TABLET ORAL 2 TIMES DAILY
Qty: 180 TABLET | Refills: 3 | Status: SHIPPED | OUTPATIENT
Start: 2024-07-03 | End: 2025-06-28